# Patient Record
Sex: FEMALE | Race: WHITE
[De-identification: names, ages, dates, MRNs, and addresses within clinical notes are randomized per-mention and may not be internally consistent; named-entity substitution may affect disease eponyms.]

---

## 2017-03-01 ENCOUNTER — HOSPITAL ENCOUNTER (OUTPATIENT)
Dept: HOSPITAL 80 - BMCIMAGING | Age: 42
End: 2017-03-01
Attending: GENERAL ACUTE CARE HOSPITAL
Payer: COMMERCIAL

## 2017-03-01 DIAGNOSIS — Z80.3: ICD-10-CM

## 2017-03-01 DIAGNOSIS — Z12.31: Primary | ICD-10-CM

## 2017-03-01 PROCEDURE — G0202 SCR MAMMO BI INCL CAD: HCPCS

## 2017-09-21 ENCOUNTER — HOSPITAL ENCOUNTER (EMERGENCY)
Dept: HOSPITAL 80 - FED | Age: 42
Discharge: HOME | End: 2017-09-21
Payer: COMMERCIAL

## 2017-09-21 VITALS
SYSTOLIC BLOOD PRESSURE: 115 MMHG | TEMPERATURE: 97.5 F | OXYGEN SATURATION: 100 % | HEART RATE: 77 BPM | DIASTOLIC BLOOD PRESSURE: 82 MMHG

## 2017-09-21 VITALS — RESPIRATION RATE: 16 BRPM

## 2017-09-21 DIAGNOSIS — G43.909: Primary | ICD-10-CM

## 2017-09-21 DIAGNOSIS — E86.9: ICD-10-CM

## 2017-09-21 PROCEDURE — 3E0337Z INTRODUCTION OF ELECTROLYTIC AND WATER BALANCE SUBSTANCE INTO PERIPHERAL VEIN, PERCUTANEOUS APPROACH: ICD-10-PCS | Performed by: EMERGENCY MEDICINE

## 2017-09-21 NOTE — EDPHY
H & P


Stated Complaint: c/o typical migraine;out of bystolic which helps w/migraines;


Time Seen by Provider: 09/21/17 07:39


HPI/ROS: 





CHIEF COMPLAINT:  Migraine headache





HISTORY OF PRESENT ILLNESS:  The patient presents to the ED with complaints of 

a severe typical migraine headache which began yesterday.  The patient took her 

usual medications for management of her migraine which have not helped.  She 

developed vomiting throughout the course of the evening.  The patient describes 

a migraine pattern which consisted of a frontal headache which is bilateral in 

nature with some radiation to the occiput.  The patient has had an extensive 

evaluation for headaches over the past year.  She has been seen at headache 

clinic in [place].  In reviewing her records she had an unremarkable brain MRI 

performed approximately 1 year ago.  The patient had been on Bystolic up until 

3 days ago which she was taking for prophylaxis.  The patient denies any acute 

fall or trauma.  She denies any complaints of acute neck pain or cervical 

manipulation.  She denies any acute numbness, weakness, dysarthria or ataxia.





REVIEW OF SYSTEMS:


A comprehensive 10 point review of systems is otherwise negative aside from 

elements mentioned in the history of present illness.


Source: Patient





- Personal History


LMP (Females 10-55): 15-21 Days Ago


Current Tetanus Diphtheria and Acellular Pertussis (TDAP): Yes





- Medical/Surgical History


Hx Asthma: No


Hx Chronic Respiratory Disease: No


Hx Diabetes: No


Hx Cardiac Disease: No


Hx Renal Disease: No


Hx Cirrhosis: No


Hx Alcoholism: No


Hx HIV/AIDS: No


Hx Splenectomy or Spleen Trauma: No


Other PMH: MIGRAINE/THYROID ISSUES





- Social History


Smoking Status: Never smoked





- Physical Exam


Exam: 





General Appearance:  Alert, no distress


Eyes:  Pupils equal and round no pallor or injection


ENT, Mouth:  Mucous membranes moist


Respiratory:  There are no retractions, lungs are clear to auscultation


Cardiovascular:  Regular rate and rhythm


Gastrointestinal:  Abdomen is soft and nontender, no masses, bowel sounds normal


Neurological:  A&O, normal motor function, normal sensory exam, normal cranial 

nerves


Skin:  Warm and dry, no rashes


Musculoskeletal:  Neck is supple nontender


Extremities:  symmetrical, full range of motion








Constitutional: 


 Initial Vital Signs











Temperature (C)  36.7 C   09/21/17 07:32


 


Heart Rate  79   09/21/17 07:32


 


Respiratory Rate  16   09/21/17 07:32


 


Blood Pressure  126/92 H  09/21/17 07:32


 


O2 Sat (%)  99   09/21/17 07:32








 











O2 Delivery Mode               Room Air














Allergies/Adverse Reactions: 


 





No Known Allergies Allergy (Verified 09/21/17 07:32)


 








Home Medications: 














 Medication  Instructions  Recorded


 


Liothyronine Sodium [Cytomel 25 25 mcg PO 09/21/17





mcg (*)]  


 


Nabumetone [Relafen 500 mg (*)] 500 mg PO 09/21/17


 


Nebivolol HCl [Bystolic 5 mg (*)] 5 mg PO DAILY 09/21/17


 


Orphenadrine Citrate [Norflex 100 100 mg PO 09/21/17





mg (*)]  


 


Protriptyline HCl [PROTRIPTYLINE 10 mg PO 09/21/17





HCL]  


 


tiZANidine HCL [Zanaflex 2MG (*)] 2 mg PO 09/21/17














Medical Decision Making


ED Course/Re-evaluation: 





The patient presents to the ED with complaints of a headache which she 

describes as a typical migraine.  The patient is noted to be afebrile without 

meningeal symptoms.  The patient is also noted to be neurologically intact.  

There is no history of trauma or historical features/risk factors worrisome for 

dissection of a neck vessel.





The patient had an IV established.  She received IV Toradol, Benadryl, Reglan 

and dexamethasone.





I re-evaluated the patient at 9:00 a.m..  She reports her headache has 

resolved.  She continues to be neurologically intact without evidence of 

meningeal symptoms or additional complaints.  At this point time the patient is 

comfortable being discharged home.  She will be advised to return to the 

emergency department for any fever, neck stiffness, recurrent headache, 

vomiting or other concerns.








Differential Diagnosis: 





Differential diagnosis considered includes tension headache, migraine headache 

and felt to be unlikely based upon her presentation today meningitis, 

intracranial hemorrhage, CNS mass.











- Data Points


Medications Given: 


 








Discontinued Medications





Dexamethasone (Decadron Injection)  10 mg IVP EDNOW ONE


   Stop: 09/21/17 07:55


   Last Admin: 09/21/17 08:22 Dose:  10 mg


Diphenhydramine HCl (Benadryl Injection)  25 mg IVP EDNOW ONE


   Stop: 09/21/17 07:55


   Last Admin: 09/21/17 08:25 Dose:  25 mg


Sodium Chloride (Ns)  1,000 mls @ 0 mls/hr IV ONCE ONE


   PRN Reason: Wide Open


   Stop: 09/21/17 08:17


   Last Admin: 09/21/17 08:20 Dose:  1,000 mls


Ketorolac Tromethamine (Toradol)  30 mg IVP EDNOW ONE


   Stop: 09/21/17 07:55


   Last Admin: 09/21/17 08:27 Dose:  30 mg


Metoclopramide HCl (Reglan Injection)  10 mg IVP EDNOW ONE


   Stop: 09/21/17 07:55


   Last Admin: 09/21/17 08:20 Dose:  10 mg








Departure





- Departure


Disposition: Home, Routine, Self-Care


Clinical Impression: 


 Migraine headache





Condition: Good


Instructions:  Migraine Headache (ED)


Additional Instructions: 


1. Please continue your regular migraine medications.


2. Return to the ED for severe headache, intractable vomiting, fever, neck 

stiffness, numbness, weakness, difficulty with speech or walking.


3. Please follow up with your regular neurologist and primary care provider as 

scheduled.


Referrals: 


Alma Foss MD [Primary Care Provider] - As per Instructions

## 2018-09-28 ENCOUNTER — HOSPITAL ENCOUNTER (OUTPATIENT)
Dept: HOSPITAL 80 - BMCIMAGING | Age: 43
End: 2018-09-28
Attending: FAMILY MEDICINE
Payer: COMMERCIAL

## 2018-09-28 DIAGNOSIS — Z12.31: Primary | ICD-10-CM

## 2019-03-26 ENCOUNTER — HOSPITAL ENCOUNTER (OUTPATIENT)
Dept: HOSPITAL 80 - FIMAGING | Age: 44
End: 2019-03-26
Attending: GENERAL PRACTICE
Payer: COMMERCIAL

## 2019-03-26 DIAGNOSIS — D25.9: ICD-10-CM

## 2019-03-26 DIAGNOSIS — R50.9: ICD-10-CM

## 2019-03-26 DIAGNOSIS — R10.2: Primary | ICD-10-CM

## 2019-03-26 DIAGNOSIS — M54.5: ICD-10-CM
